# Patient Record
Sex: MALE | Race: WHITE | NOT HISPANIC OR LATINO | Employment: OTHER | URBAN - METROPOLITAN AREA
[De-identification: names, ages, dates, MRNs, and addresses within clinical notes are randomized per-mention and may not be internally consistent; named-entity substitution may affect disease eponyms.]

---

## 2017-05-01 ENCOUNTER — HOSPITAL ENCOUNTER (OUTPATIENT)
Dept: LAB | Facility: HOSPITAL | Age: 82
Discharge: HOME | End: 2017-05-01
Attending: FAMILY MEDICINE | Admitting: FAMILY MEDICINE

## 2017-05-01 LAB
INR PPP: 2.1 (ref 2–3)
PROTHROMBIN TIME: 22.7 SEC (ref 19.4–28.5)

## 2017-09-01 ENCOUNTER — HOSPITAL ENCOUNTER (OUTPATIENT)
Dept: PAIN MEDICINE | Facility: HOSPITAL | Age: 82
Discharge: HOME | End: 2017-09-01
Attending: ANESTHESIOLOGY | Admitting: ANESTHESIOLOGY

## 2017-09-19 ENCOUNTER — HOSPITAL ENCOUNTER (OUTPATIENT)
Dept: PAIN MEDICINE | Facility: HOSPITAL | Age: 82
Discharge: HOME | End: 2017-09-19
Attending: ANESTHESIOLOGY | Admitting: ANESTHESIOLOGY

## 2017-11-10 ENCOUNTER — HOSPITAL ENCOUNTER (OUTPATIENT)
Dept: PAIN MEDICINE | Facility: HOSPITAL | Age: 82
Discharge: HOME | End: 2017-11-10
Attending: ANESTHESIOLOGY | Admitting: ANESTHESIOLOGY

## 2017-11-10 ENCOUNTER — HOSPITAL ENCOUNTER (OUTPATIENT)
Dept: LAB | Facility: HOSPITAL | Age: 82
Discharge: HOME | End: 2017-11-10
Attending: ANESTHESIOLOGY | Admitting: ANESTHESIOLOGY

## 2017-11-10 LAB
INR PPP: 1.1
PROTHROMBIN TIME: 11.5 SEC (ref 9.6–11.7)

## 2019-12-31 ENCOUNTER — TRANSCRIBE ORDERS (OUTPATIENT)
Dept: ADMINISTRATIVE | Facility: HOSPITAL | Age: 84
End: 2019-12-31

## 2019-12-31 DIAGNOSIS — R06.02 SHORTNESS OF BREATH: Primary | ICD-10-CM

## 2020-01-03 ENCOUNTER — HOSPITAL ENCOUNTER (OUTPATIENT)
Dept: CARDIOLOGY | Facility: HOSPITAL | Age: 85
Discharge: HOME | End: 2020-01-03
Admitting: INTERNAL MEDICINE

## 2020-01-03 DIAGNOSIS — R06.02 SHORTNESS OF BREATH: ICD-10-CM

## 2020-01-03 PROCEDURE — 93306 TTE W/DOPPLER COMPLETE: CPT

## 2020-01-04 LAB
BH CV ECHO MEAS - ACS: 2.2 CM
BH CV ECHO MEAS - AO MAX PG: 3.8 MMHG
BH CV ECHO MEAS - AO MEAN PG: 2.1 MMHG
BH CV ECHO MEAS - AO ROOT DIAM: 3.9 CM
BH CV ECHO MEAS - AO V2 MAX: 97 CM/SEC
BH CV ECHO MEAS - AO V2 VTI: 20 CM
BH CV ECHO MEAS - AVA(I,D): 3.4 CM2
BH CV ECHO MEAS - EDV(CUBED): 132.3 ML
BH CV ECHO MEAS - EDV(MOD-SP4): 124 ML
BH CV ECHO MEAS - EF(CUBED): 64 %
BH CV ECHO MEAS - EF(MOD-SP4): 55 %
BH CV ECHO MEAS - EF(TEICH): 55 %
BH CV ECHO MEAS - ESV(CUBED): 47.8 ML
BH CV ECHO MEAS - ESV(MOD-SP4): 56 ML
BH CV ECHO MEAS - FS: 29 %
BH CV ECHO MEAS - IVSD: 1 CM
BH CV ECHO MEAS - LA DIMENSION(2D): 4.1 CM
BH CV ECHO MEAS - LV MAX PG: 3.2 MMHG
BH CV ECHO MEAS - LV MEAN PG: 1.6 MMHG
BH CV ECHO MEAS - LV V1 MAX: 90 CM/SEC
BH CV ECHO MEAS - LV V1 VTI: 19 CM
BH CV ECHO MEAS - LVIDD: 5.1 CM
BH CV ECHO MEAS - LVIDS: 3.6 CM
BH CV ECHO MEAS - LVOT DIAM: 2.2 CM
BH CV ECHO MEAS - MV A MAX VEL: 34.1 CM/SEC
BH CV ECHO MEAS - MV DEC TIME: 0 MSEC
BH CV ECHO MEAS - MV E MAX VEL: 87 CM/SEC
BH CV ECHO MEAS - MV E/A: 2.5
BH CV ECHO MEAS - MV MAX PG: 4 MMHG
BH CV ECHO MEAS - MV MEAN PG: 1 MMHG
BH CV ECHO MEAS - MV P1/2T: 628 MSEC
BH CV ECHO MEAS - MV V2 VTI: 25 CM
BH CV ECHO MEAS - MVA(P1/2T): 2.8 CM2
BH CV ECHO MEAS - PA ACC TIME: 0 SEC
BH CV ECHO MEAS - RAP SYSTOLE: 3 MMHG
BH CV ECHO MEAS - RV MAX PG: 97 MMHG
BH CV ECHO MEAS - RV V1 VTI: 13 CM
BH CV ECHO MEAS - RVDD(2D): 2.4 CM
BH CV ECHO MEAS - RVSP: 23 MMHG
BH CV ECHO MEAS - SV(MOD-SP4): 68 ML
BH CV ECHO MEAS - TR MAX PG: 20
BH CV ECHO MEAS - TR MAX VEL: 223 CM/SEC
Lab: 1 CM
MAXIMAL PREDICTED HEART RATE: 130 BPM
STRESS TARGET HR: 111 BPM

## 2020-01-04 PROCEDURE — 93306 TTE W/DOPPLER COMPLETE: CPT | Performed by: INTERNAL MEDICINE

## 2023-08-17 ENCOUNTER — OFFICE VISIT (OUTPATIENT)
Dept: CARDIOLOGY | Facility: CLINIC | Age: 88
End: 2023-08-17
Payer: MEDICARE

## 2023-08-17 VITALS
HEART RATE: 98 BPM | WEIGHT: 146 LBS | DIASTOLIC BLOOD PRESSURE: 64 MMHG | SYSTOLIC BLOOD PRESSURE: 98 MMHG | HEIGHT: 64 IN | BODY MASS INDEX: 24.92 KG/M2

## 2023-08-17 DIAGNOSIS — I48.0 PAF (PAROXYSMAL ATRIAL FIBRILLATION): Primary | ICD-10-CM

## 2023-08-17 PROCEDURE — 99204 OFFICE O/P NEW MOD 45 MIN: CPT | Performed by: INTERNAL MEDICINE

## 2023-08-17 PROCEDURE — 93000 ELECTROCARDIOGRAM COMPLETE: CPT | Performed by: INTERNAL MEDICINE

## 2023-08-17 RX ORDER — OMEPRAZOLE 40 MG/1
1 CAPSULE, DELAYED RELEASE ORAL DAILY
COMMUNITY
Start: 2017-09-01

## 2023-08-17 RX ORDER — SIMVASTATIN 20 MG
1 TABLET ORAL DAILY
COMMUNITY
Start: 2017-09-01

## 2023-08-17 RX ORDER — FUROSEMIDE 20 MG/1
1 TABLET ORAL 2 TIMES DAILY
COMMUNITY
Start: 2017-09-01

## 2023-08-17 RX ORDER — DIGOXIN 125 MCG
125 TABLET ORAL 3 TIMES WEEKLY
COMMUNITY

## 2023-08-17 RX ORDER — PRIMIDONE 50 MG/1
3 TABLET ORAL 2 TIMES DAILY
COMMUNITY

## 2023-08-17 RX ORDER — WARFARIN SODIUM 2 MG/1
TABLET ORAL
COMMUNITY

## 2023-08-17 RX ORDER — WARFARIN SODIUM 4 MG/1
TABLET ORAL
COMMUNITY

## 2023-08-17 RX ORDER — LEVOTHYROXINE SODIUM 0.05 MG/1
1 TABLET ORAL DAILY
COMMUNITY
Start: 2017-09-01

## 2023-08-17 RX ORDER — AMIODARONE HYDROCHLORIDE 200 MG/1
1 TABLET ORAL DAILY
COMMUNITY

## 2023-08-17 RX ORDER — FERROUS SULFATE 325(65) MG
325 TABLET ORAL
COMMUNITY

## 2023-08-17 RX ORDER — POTASSIUM CHLORIDE 20 MEQ/1
20 TABLET, EXTENDED RELEASE ORAL DAILY
COMMUNITY

## 2023-08-17 NOTE — PROGRESS NOTES
Date of Office Visit: 2023  Encounter Provider: Papi Blood MD  Place of Service: [facility] CARDIOLOGY  Patient Name: Royal DHEERAJ Antonio  :1929    Chief Complaint   Patient presents with    Atrial Fibrillation           Hypertension           Sleep Apnea          :     HPI: Royal DHEERAJ Antonio is a 89 y.o. male who presents today for atrial fibrillation.      He followed with [facility].  Son and daughter in law are with him.     He has atrial fibrillation, PAF by notes, but is in AF today.     He is not aware of the AF    Some cognitive dysfunction    He is on amiodarone, prior notes say sotalol as well, but per his med list not currently.             Past Medical History:   Diagnosis Date    A-fib     HTN (hypertension)     Hyperlipidemia        No past surgical history on file.    Social History     Socioeconomic History    Marital status:        No family history on file.    Review of Systems   Unable to perform ROS: Dementia     No Known Allergies      Current Outpatient Medications:     amiodarone (PACERONE) 200 MG tablet, Take 1 tablet by mouth Daily., Disp: , Rfl:     digoxin (LANOXIN) 125 MCG tablet, Take 1 tablet by mouth 3 (Three) Times a Week., Disp: , Rfl:     ferrous sulfate 325 (65 FE) MG tablet, Take 1 tablet by mouth Daily With Breakfast., Disp: , Rfl:     furosemide (LASIX) 20 MG tablet, Take 1 tablet by mouth 2 (Two) Times a Day., Disp: , Rfl:     levothyroxine (SYNTHROID, LEVOTHROID) 50 MCG tablet, Take 1 tablet by mouth Daily., Disp: , Rfl:     Misc Natural Products (JOINT SUPPORT PO), Take  by mouth Daily., Disp: , Rfl:     omeprazole (priLOSEC) 40 MG capsule, Take 1 capsule by mouth Daily., Disp: , Rfl:     potassium chloride (K-DUR,KLOR-CON) 20 MEQ CR tablet, Take 1 tablet by mouth Daily., Disp: , Rfl:     primidone (MYSOLINE) 50 MG tablet, Take 3 tablets by mouth 2 (Two) Times a Day., Disp: , Rfl:     simvastatin (ZOCOR) 20 MG tablet, Take 1  "tablet by mouth Daily., Disp: , Rfl:     Sotalol HCl (SORINE PO), Take 40 mg by mouth 2 (Two) Times a Day., Disp: , Rfl:     warfarin (COUMADIN) 2 MG tablet, Take 1 tablet every day by oral route for 90 days., Disp: , Rfl:     warfarin (COUMADIN) 4 MG tablet, Take 2 tablets every day by oral route as directed for 90 days., Disp: , Rfl:       Objective:     Vitals:    08/17/23 1258   BP: 98/64   Pulse: 98   Weight: 66.2 kg (146 lb)   Height: 162.6 cm (64\")     Body mass index is 25.06 kg/mý.    PHYSICAL EXAM:    Vitals and nursing note reviewed.   Constitutional:       Appearance: Well-developed. Frail.   Eyes:      General:         Right eye: No discharge.         Left eye: No discharge.      Conjunctiva/sclera: Conjunctivae normal.   HENT:      Head: Normocephalic and atraumatic.   Neck:      Vascular: No JVD.   Pulmonary:      Effort: Pulmonary effort is normal. No respiratory distress.   Cardiovascular:      Normal rate. Irregular rhythm.      Murmurs: There is no murmur.   Edema:     Peripheral edema absent.   Abdominal:      General: There is no distension.      Tenderness: There is no abdominal tenderness.   Musculoskeletal: Normal range of motion.      Cervical back: Neck supple. Neurological:      General: No focal deficit present.      Mental Status: Alert and oriented to person, place and time.      Cranial Nerves: No cranial nerve deficit.   Psychiatric:         Speech: Speech is tangential.         Behavior: Behavior is cooperative.           ECG 12 Lead    Date/Time: 8/17/2023 3:31 PM  Performed by: Papi Blood MD  Authorized by: Papi Blood MD   Rhythm: atrial fibrillation  Conduction: right bundle branch block          Assessment:       Diagnosis Plan   1. PAF (paroxysmal atrial fibrillation)               Plan:       PAF, but possibly persistent    Asymptomatic    There is no reason to continue amiodarone and I've asked him to discontinue.  He is not taking sotalol by his med " list, but on prior notes.  I asked them to make sure.     We will stop simvastatin.  This will further help reduce medication burden and no evidence of benefit in this age group.     We discussed switching from wafarin to eliquis.  We are not at this time, but would consider at future visits.     As always, it has been a pleasure to participate in your patient's care.      Sincerely,         Papi Blood MD

## 2024-01-10 ENCOUNTER — TELEPHONE (OUTPATIENT)
Dept: CARDIOLOGY | Facility: CLINIC | Age: 89
End: 2024-01-10
Payer: MEDICARE

## 2024-01-10 NOTE — TELEPHONE ENCOUNTER
"I spoke with pt's son, Michael, who shares that pt's wife has just recently passed away.  They've also attended someone else's  this week and will be burying pt's wife in the near future.      On 24 pt went to go see his PCP (saw COLE as MD wasn't in office) for a rash on his leg.  Incidentally while he was there, COLE noted pt's lungs sounded \"gunky\" and he had a cough.  She prescribed Augmentin BID for pt for both of these.  Pt also had blue fingertips in the office appt and thus oxygen was prescribed for him.  Michael tells me that he wasn't told a diagnosis for the O2 therapy or what the underlying cause was.  Pt's cough has improved since being on medication.    Pt has been wearing 2L NC at home continuously with an O2 sat in the low 90s.  When oxygen is removed, he drops to 82-85%.  When oxygen is off, his fingers and toes turn blue and pt has some slight oral cyanosis.  Last BP he has on pt was at the appt Thursday- 130/85.  He's unsure what his HR has been running.  Pt denies dizziness, lightheadedness, or palpitations.    Michael felt pt should be seen for an appt, and they are driving his mother's body up from [place] tomorrow.  He says that pt would be able to come in some time Friday afternoon for an appt.    Do you have any recommendations for this patient?    Thank you,    Kavita VIDALES RN  [facility]  01/10/24  16:13 EST    "

## 2024-01-10 NOTE — TELEPHONE ENCOUNTER
Caller: JANELLE DOMINGUEZ    Relationship to patient: Emergency Contact    Best call back number: [phone]    Chief complaint: HAVING TO BE ON OXYGEN (HAD BLUE FINGERNAILS WITH OUT OXYGEN) SINCE 1/4/24. HIS WIFE JUST PASSED FAMILY ISN'T SURE IF IT STRESS OR HEART RELATED    Type of visit: FOLLOW UP    Requested date:OUT OF TOWN FOR FUNAERAL BUT CAN BE BACK FOR A FRIDAY 1/12/24 AFTERNOON APPT OR SOMETHING MONDAY IF POSSIBLE.

## 2024-01-16 ENCOUNTER — TRANSCRIBE ORDERS (OUTPATIENT)
Dept: ADMINISTRATIVE | Facility: HOSPITAL | Age: 89
End: 2024-01-16
Payer: MEDICARE

## 2024-01-16 ENCOUNTER — HOSPITAL ENCOUNTER (OUTPATIENT)
Dept: RESPIRATORY THERAPY | Facility: HOSPITAL | Age: 89
Discharge: HOME | End: 2024-01-16
Payer: MEDICARE

## 2024-01-16 DIAGNOSIS — I48.91 ATRIAL FIBRILLATION, UNSPECIFIED TYPE: Primary | ICD-10-CM

## 2024-01-16 DIAGNOSIS — I48.91 ATRIAL FIBRILLATION, UNSPECIFIED TYPE: ICD-10-CM

## 2024-01-16 DIAGNOSIS — I24.9 ACUTE CORONARY SYNDROME: ICD-10-CM

## 2024-01-16 LAB
ARTERIAL PATENCY WRIST A: POSITIVE
ATMOSPHERIC PRESS: NORMAL MM[HG]
BASE EXCESS BLDA CALC-SCNC: 1.8 MMOL/L (ref 0–3)
BDY SITE: NORMAL
CO2 BLDA-SCNC: 27 MMOL/L (ref 22–29)
HCO3 BLDA-SCNC: 25.8 MMOL/L (ref 21–28)
HEMODILUTION: NO
MODALITY: NORMAL
PCO2 BLDA: 37.5 MM HG (ref 35–48)
PH BLDA: 7.45 PH UNITS (ref 7.35–7.45)
PO2 BLDA: 91.9 MM HG (ref 83–108)
SAO2 % BLDCOA: 97.5 % (ref 94–98)

## 2024-01-16 PROCEDURE — 36600 WITHDRAWAL OF ARTERIAL BLOOD: CPT

## 2024-01-16 PROCEDURE — 82803 BLOOD GASES ANY COMBINATION: CPT

## 2024-04-02 ENCOUNTER — OFFICE VISIT (OUTPATIENT)
Age: 89
End: 2024-04-02
Payer: MEDICARE

## 2024-04-02 VITALS
BODY MASS INDEX: 23.39 KG/M2 | SYSTOLIC BLOOD PRESSURE: 118 MMHG | DIASTOLIC BLOOD PRESSURE: 64 MMHG | HEIGHT: 64 IN | WEIGHT: 137 LBS | HEART RATE: 98 BPM

## 2024-04-02 DIAGNOSIS — I48.0 PAF (PAROXYSMAL ATRIAL FIBRILLATION): Primary | ICD-10-CM

## 2024-04-02 PROCEDURE — 99213 OFFICE O/P EST LOW 20 MIN: CPT

## 2024-04-02 PROCEDURE — 93000 ELECTROCARDIOGRAM COMPLETE: CPT

## 2024-04-02 RX ORDER — APIXABAN 2.5 MG/1
1 TABLET, FILM COATED ORAL 2 TIMES DAILY
COMMUNITY

## 2024-04-02 RX ORDER — MELATONIN
1000 DAILY
COMMUNITY

## 2024-04-02 NOTE — PROGRESS NOTES
Date of Office Visit: 2024  Encounter Provider: COLE Giraldo  Place of Service: [facility] CARDIOLOGY  Patient Name: Royal DHEERAJ Antonio  :1929    Chief Complaint   Patient presents with    Atrial Fibrillation   :     HPI: Royal DHEERAJ Antonio is a 89 y.o. male who followed with Dr. Foster, referred to Dr. Blood. He has atrial fibrillation.     Saw Dr. Blood last August. Was on amiodarone and sotalol for AF. He was in persistent during that visit, sotalol was stopped.     Since he had no awareness of his AF, recommended rate control.     His son called the office in January stating that his dad was having issues with low oxygenation and was requiring temporary oxygen.  I recommended that he go to the ER or be evaluated by PCP.                Presents today for follow-up appointment.    He recently had a mechanical fall and fractured a couple ribs on his left shoulder.    Cording to his son.  They do not plan on operating just monitoring and allowing to heal.  He has follow-up with them soon    In terms of his cardiac health.  He has been doing well.  He has no complaints or concerns in office today.    He remains in atrial fibrillation.  He has no awareness that he is in A-fib.    Denies any chest pain, shortness of breath, palpitations, or syncope    His heart rate is well-controlled.  He is on digoxin 125 mcg daily.    He was on sotalol and amiodarone in the past.  These have both been stopped.    His son says that he follows with Dr. Barroso PCP for routine lab work and recently had labs drawn.    He is on apixaban for AC.          Past Medical History:   Diagnosis Date    A-fib     HTN (hypertension)     Hyperlipidemia        No past surgical history on file.    Social History     Socioeconomic History    Marital status:        No family history on file.    Review of Systems   Constitutional: Negative for chills, fever and malaise/fatigue.   Cardiovascular:   "Negative for chest pain, dyspnea on exertion, leg swelling, near-syncope, orthopnea, palpitations, paroxysmal nocturnal dyspnea and syncope.   Respiratory:  Negative for cough and shortness of breath.    Hematologic/Lymphatic: Negative.    Musculoskeletal:  Negative for joint pain, joint swelling and myalgias.   Gastrointestinal:  Negative for abdominal pain, diarrhea, melena, nausea and vomiting.   Genitourinary:  Negative for frequency and hematuria.   Neurological:  Negative for light-headedness, numbness, paresthesias and seizures.   Allergic/Immunologic: Negative.    All other systems reviewed and are negative.      No Known Allergies      Current Outpatient Medications:     Cholecalciferol 25 MCG (1000 UT) tablet, Take 1 tablet by mouth Daily., Disp: , Rfl:     digoxin (LANOXIN) 125 MCG tablet, Take 1 tablet by mouth Daily., Disp: , Rfl:     Eliquis 2.5 MG tablet tablet, Take 1 tablet by mouth 2 (Two) Times a Day., Disp: , Rfl:     ferrous sulfate 325 (65 FE) MG tablet, Take 1 tablet by mouth Daily With Breakfast., Disp: , Rfl:     furosemide (LASIX) 20 MG tablet, Take 1 tablet by mouth 2 (Two) Times a Day., Disp: , Rfl:     levothyroxine (SYNTHROID, LEVOTHROID) 50 MCG tablet, Take 1 tablet by mouth Daily., Disp: , Rfl:     Misc Natural Products (JOINT SUPPORT PO), Take  by mouth Daily., Disp: , Rfl:     omeprazole (priLOSEC) 40 MG capsule, Take 1 capsule by mouth Daily., Disp: , Rfl:     potassium chloride (K-DUR,KLOR-CON) 20 MEQ CR tablet, Take 1 tablet by mouth Daily., Disp: , Rfl:       Objective:     Vitals:    04/02/24 1114   BP: 118/64   Pulse: 98   Weight: 62.1 kg (137 lb)   Height: 162.6 cm (64\")     Body mass index is 23.52 kg/m².    PHYSICAL EXAM:    Vitals Reviewed.   General Appearance: No acute distress, well developed and well nourished.   Eyes: Conjunctiva and lids: No erythema, swelling, or discharge. Sclera non-icteric.   HENT: Atraumatic, normocephalic. External eyes, ears, and nose normal. "   Respiratory: No signs of respiratory distress. Respiration rhythm and depth normal.   Clear to auscultation. No rales, crackles, rhonchi, or wheezing auscultated.   Cardiovascular:  Heart Rate and Rhythm: Normal, Heart Sounds: Normal S1 and S2. No S3 or S4 noted.  Gastrointestinal:  Abdomen soft, non-distended, non-tender.   Musculoskeletal: Normal movement of extremities  Skin: Warm and dry.   Psychiatric: Patient alert and oriented to person, place, and time. Speech and behavior appropriate. Normal mood and affect.       ECG 12 Lead    Date/Time: 4/2/2024 11:46 AM  Performed by: Rafat Thompson APRN    Authorized by: Rafat Thompson APRN  Comparison: compared with previous ECG   Similar to previous ECG  Rhythm: sinus rhythm            Assessment:       Diagnosis Plan   1. PAF (paroxysmal atrial fibrillation)               Plan:   PAF--- suspect he has now gone into persistent atrial fibrillation.  Rhythm control was pursued in the past with sotalol and amiodarone but these were both stopped and we are now pursuing rate control.  He has no symptoms associated with his atrial fibrillation and his rate is well-controlled on low-dose digoxin.    I would not recommend any aggressive treatment for his atrial fibrillation.  If he does have issues with rate control.  I would consider starting low-dose beta-blocker but would not restart sotalol or amiodarone..    Continue apixaban for AC.          Follow-up in 1 year with Dr. Boyce or sooner if he has issues with his atrial fibrillation.          As always, it has been a pleasure to participate in your patient's care.      Sincerely,         COLE Giraldo

## 2024-06-14 ENCOUNTER — APPOINTMENT (OUTPATIENT)
Dept: CT IMAGING | Facility: HOSPITAL | Age: 89
End: 2024-06-14
Payer: MEDICARE

## 2024-06-14 ENCOUNTER — HOSPITAL ENCOUNTER (EMERGENCY)
Facility: HOSPITAL | Age: 89
Discharge: HOME | End: 2024-06-14
Attending: EMERGENCY MEDICINE
Payer: MEDICARE

## 2024-06-14 VITALS
TEMPERATURE: 98.3 F | SYSTOLIC BLOOD PRESSURE: 124 MMHG | OXYGEN SATURATION: 97 % | HEIGHT: 63 IN | WEIGHT: 146 LBS | HEART RATE: 91 BPM | BODY MASS INDEX: 25.87 KG/M2 | RESPIRATION RATE: 18 BRPM | DIASTOLIC BLOOD PRESSURE: 74 MMHG

## 2024-06-14 DIAGNOSIS — S61.412A SKIN TEAR OF LEFT HAND WITHOUT COMPLICATION, INITIAL ENCOUNTER: ICD-10-CM

## 2024-06-14 DIAGNOSIS — S09.90XA CLOSED HEAD INJURY, INITIAL ENCOUNTER: Primary | ICD-10-CM

## 2024-06-14 DIAGNOSIS — S01.81XA LACERATION OF FOREHEAD, INITIAL ENCOUNTER: ICD-10-CM

## 2024-06-14 PROCEDURE — 99284 EMERGENCY DEPT VISIT MOD MDM: CPT

## 2024-06-14 PROCEDURE — 12052 INTMD RPR FACE/MM 2.6-5.0 CM: CPT | Performed by: EMERGENCY MEDICINE

## 2024-06-14 PROCEDURE — 99283 EMERGENCY DEPT VISIT LOW MDM: CPT | Performed by: EMERGENCY MEDICINE

## 2024-06-14 PROCEDURE — 70450 CT HEAD/BRAIN W/O DYE: CPT

## 2024-06-14 RX ORDER — DIAPER,BRIEF,INFANT-TODD,DISP
1 EACH MISCELLANEOUS ONCE
Status: COMPLETED | OUTPATIENT
Start: 2024-06-14 | End: 2024-06-14

## 2024-06-14 RX ORDER — LIDOCAINE HYDROCHLORIDE AND EPINEPHRINE 10; 10 MG/ML; UG/ML
10 INJECTION, SOLUTION INFILTRATION; PERINEURAL ONCE
Status: COMPLETED | OUTPATIENT
Start: 2024-06-14 | End: 2024-06-14

## 2024-06-14 RX ORDER — CEPHALEXIN 500 MG/1
500 CAPSULE ORAL 4 TIMES DAILY
Qty: 40 CAPSULE | Refills: 0 | Status: SHIPPED | OUTPATIENT
Start: 2024-06-14 | End: 2024-06-24

## 2024-06-14 RX ADMIN — LIDOCAINE HYDROCHLORIDE AND EPINEPHRINE 10 ML: 10; 10 INJECTION, SOLUTION INFILTRATION; PERINEURAL at 11:19

## 2024-06-14 RX ADMIN — BACITRACIN 0.9 G: 500 OINTMENT TOPICAL at 11:55

## 2024-06-14 NOTE — FSED PROVIDER NOTE
Subjective   History of Present Illness  Anticoagulated 89-year-old male presents for evaluation after having a ground-level fall hitting his forehead today.  He was bending over to  a piece of trash and he lost his balance going forward.  Did not have any loss of consciousness does not have severe headache.  Injury happened about 45 minutes prior to arrival.  Patient and family state that he is up-to-date on tetanus.  Review of Systems   Skin:  Positive for wound.       Past Medical History:   Diagnosis Date    A-fib     HTN (hypertension)     Hyperlipidemia        No Known Allergies    History reviewed. No pertinent surgical history.    History reviewed. No pertinent family history.    Social History     Socioeconomic History    Marital status:            Objective   Physical Exam  Nursing notes reviewed.  INITIAL VITAL SIGNS: Reviewed by me.  Pulse ox normal  GENERAL: Alert. Well developed and well nourished. No respiratory distress.  HEAD: 5 cm jagged laceration across the mid forehead  EYES: No conjunctival injection.  ENT: Oral mucosa is moist.  NECK/BACK: Supple. Full range of motion.  RESPIRATORY: Non-labored respirations.   EXTREMITIES: No deformity.  Small skin tear on the dorsum of the left hand approximately 1 cm across in a nearly semicircular shape  SKIN: Warm and dry. No rashes. No diaphoresis.  NEUROLOGIC: Alert. Normal gait    Laceration Repair    Date/Time: 6/14/2024 11:43 AM    Performed by: Anders Lyon MD  Authorized by: Anders Lyon MD    Consent:     Consent obtained:  Verbal    Consent given by:  Patient    Risks, benefits, and alternatives were discussed: yes      Risks discussed:  Infection, pain, retained foreign body, need for additional repair and poor cosmetic result  Universal protocol:     Procedure explained and questions answered to patient or proxy's satisfaction: yes      Immediately prior to procedure, a time out was called: yes      Patient identity  confirmed:  Verbally with patient  Anesthesia:     Anesthesia method:  Local infiltration    Local anesthetic:  Lidocaine 1% WITH epi  Laceration details:     Location:  Face    Face location:  Forehead    Length (cm):  5  Pre-procedure details:     Preparation:  Patient was prepped and draped in usual sterile fashion (CT scan obtained)  Exploration:     Limited defect created (wound extended): no      Contaminated: yes    Treatment:     Area cleansed with:  Saline    Amount of cleaning:  Extensive    Irrigation solution:  Sterile saline    Visualized foreign bodies/material removed: yes (removed 3 small rocks, 3 pieces of leaf, 1 piece grass)      Debridement:  Minimal    Undermining:  None    Scar revision: no    Skin repair:     Repair method:  Sutures    Suture size:  5-0    Suture material:  Fast-absorbing gut    Suture technique:  Simple interrupted    Number of sutures:  5  Approximation:     Approximation:  Close  Repair type:     Repair type:  Intermediate  Post-procedure details:     Dressing:  Antibiotic ointment and non-adherent dressing    Procedure completion:  Tolerated             ED Course                                           Medical Decision Making  Problems Addressed:  Closed head injury, initial encounter: complicated acute illness or injury  Laceration of forehead, initial encounter: complicated acute illness or injury  Skin tear of left hand without complication, initial encounter: complicated acute illness or injury    Amount and/or Complexity of Data Reviewed  Radiology: ordered.    Risk  OTC drugs.  Prescription drug management.    Patient with low velocity head injury but on Eliquis so CT was obtained there is no intracranial hemorrhage.  There are multiple foreign bodies seen on imaging also seen with direct visualization removed as noted.  Patient also has skin tear on dorsum of left hand this repaired with Steri-Strips.  Due to dirtiness of wound putting on prophylactic  antibiotics    Final diagnoses:   Closed head injury, initial encounter   Laceration of forehead, initial encounter   Skin tear of left hand without complication, initial encounter       ED Disposition  ED Disposition       ED Disposition   Discharge    Condition   Good    Comment   --               Ravinder Barroso MD  [address] [place] [address] [phone]    Call   As needed         Medication List        New Prescriptions      cephalexin 500 MG capsule  Commonly known as: KEFLEX  Take 1 capsule by mouth 4 (Four) Times a Day for 10 days.               Where to Get Your Medications        These medications were sent to [id number] - [place], [place] - [address] [place] [address] [address]  - [phone]  - [phone] [address] [place] [address]      Phone: [phone]   cephalexin 500 MG capsule

## 2024-06-14 NOTE — DISCHARGE INSTRUCTIONS
Keep dressing on wound until Sunday, you may take it off and then bathe normally.  Do not go swimming until laceration is healed.  Take medications as prescribed.  Follow-up with your doctor.  Return for reevaluation if you have concerns for infection despite antibiotics including but not limited to swelling, worsening pain, worsening redness, discharge of purulent material.

## 2024-12-15 ENCOUNTER — HOSPITAL ENCOUNTER (OUTPATIENT)
Facility: HOSPITAL | Age: 89
Discharge: HOME | End: 2024-12-15
Attending: EMERGENCY MEDICINE | Admitting: EMERGENCY MEDICINE
Payer: MEDICARE

## 2024-12-15 ENCOUNTER — APPOINTMENT (OUTPATIENT)
Dept: GENERAL RADIOLOGY | Facility: HOSPITAL | Age: 89
End: 2024-12-15
Payer: MEDICARE

## 2024-12-15 VITALS
HEIGHT: 65 IN | SYSTOLIC BLOOD PRESSURE: 110 MMHG | RESPIRATION RATE: 20 BRPM | BODY MASS INDEX: 23.32 KG/M2 | HEART RATE: 107 BPM | DIASTOLIC BLOOD PRESSURE: 58 MMHG | TEMPERATURE: 97.8 F | WEIGHT: 140 LBS | OXYGEN SATURATION: 94 %

## 2024-12-15 DIAGNOSIS — J18.9 PNEUMONIA OF BOTH LOWER LOBES DUE TO INFECTIOUS ORGANISM: Primary | ICD-10-CM

## 2024-12-15 LAB
FLUAV SUBTYP SPEC NAA+PROBE: NOT DETECTED
FLUBV RNA ISLT QL NAA+PROBE: NOT DETECTED
RSV RNA NPH QL NAA+NON-PROBE: NOT DETECTED
SARS-COV-2 RNA RESP QL NAA+PROBE: NOT DETECTED

## 2024-12-15 PROCEDURE — G0463 HOSPITAL OUTPT CLINIC VISIT: HCPCS | Performed by: EMERGENCY MEDICINE

## 2024-12-15 PROCEDURE — 71046 X-RAY EXAM CHEST 2 VIEWS: CPT

## 2024-12-15 PROCEDURE — 87637 SARSCOV2&INF A&B&RSV AMP PRB: CPT | Performed by: EMERGENCY MEDICINE

## 2024-12-15 RX ORDER — ACETAMINOPHEN 500 MG
500 TABLET ORAL EVERY 6 HOURS PRN
Qty: 30 TABLET | Refills: 0 | Status: SHIPPED | OUTPATIENT
Start: 2024-12-15

## 2024-12-15 RX ORDER — ACETAMINOPHEN 500 MG
500 TABLET ORAL EVERY 6 HOURS PRN
Qty: 30 TABLET | Refills: 0 | Status: SHIPPED | OUTPATIENT
Start: 2024-12-15 | End: 2024-12-15

## 2024-12-15 RX ORDER — ALBUTEROL SULFATE 90 UG/1
2 INHALANT RESPIRATORY (INHALATION) EVERY 4 HOURS PRN
Qty: 8 G | Refills: 0 | Status: SHIPPED | OUTPATIENT
Start: 2024-12-15 | End: 2024-12-15

## 2024-12-15 RX ORDER — DEXTROMETHORPHAN HYDROBROMIDE AND PROMETHAZINE HYDROCHLORIDE 15; 6.25 MG/5ML; MG/5ML
5 SYRUP ORAL 4 TIMES DAILY PRN
Qty: 473 ML | Refills: 0 | Status: SHIPPED | OUTPATIENT
Start: 2024-12-15 | End: 2024-12-15

## 2024-12-15 RX ORDER — ALBUTEROL SULFATE 90 UG/1
2 INHALANT RESPIRATORY (INHALATION) EVERY 4 HOURS PRN
Qty: 8 G | Refills: 0 | Status: SHIPPED | OUTPATIENT
Start: 2024-12-15

## 2024-12-15 RX ORDER — DEXTROMETHORPHAN HYDROBROMIDE AND PROMETHAZINE HYDROCHLORIDE 15; 6.25 MG/5ML; MG/5ML
5 SYRUP ORAL 4 TIMES DAILY PRN
Qty: 473 ML | Refills: 0 | Status: SHIPPED | OUTPATIENT
Start: 2024-12-15

## 2024-12-15 NOTE — FSED PROVIDER NOTE
Subjective   History of Present Illness  Patient with copmlaint of cough, congestion for almost 2 weeks. Cough productive of yellow sputum. Ill contacts with family. No chest pressure or discomfort. No leg pain or swelling. No other complaints. No fever, shakes, but chills. No vomiting, diarrhea. No precipitating or rlieeving factors. No pattern to symptoms.     History provided by:  Patient and relative   used: No        Review of Systems   All other systems reviewed and are negative.      Past Medical History:   Diagnosis Date    A-fib     HTN (hypertension)     Hyperlipidemia        No Known Allergies    History reviewed. No pertinent surgical history.    History reviewed. No pertinent family history.    Social History     Socioeconomic History    Marital status:    Substance and Sexual Activity    Drug use: Never    Sexual activity: Defer           Objective   Physical Exam  Constitutional:       Appearance: Normal appearance.   HENT:      Mouth/Throat:      Mouth: Mucous membranes are moist.      Pharynx: Oropharynx is clear.   Eyes:      Extraocular Movements: Extraocular movements intact.      Pupils: Pupils are equal, round, and reactive to light.   Cardiovascular:      Rate and Rhythm: Normal rate. Rhythm irregular.      Pulses: Normal pulses.      Heart sounds: Normal heart sounds.   Pulmonary:      Effort: Pulmonary effort is normal. No respiratory distress.      Breath sounds: No stridor. Rhonchi present. No wheezing or rales.   Chest:      Chest wall: No tenderness.   Musculoskeletal:         General: Normal range of motion.      Cervical back: Normal range of motion.   Skin:     General: Skin is warm.      Capillary Refill: Capillary refill takes less than 2 seconds.   Neurological:      General: No focal deficit present.      Mental Status: He is alert and oriented to person, place, and time.   Psychiatric:         Mood and Affect: Mood normal.         Procedures            ED Course                                           Medical Decision Making  Concern for uri, pneumonia. D/w patient and son. Agrees with plan.    Amount and/or Complexity of Data Reviewed  Labs: ordered.  Radiology: ordered.        Final diagnoses:   Pneumonia of both lower lobes due to infectious organism       ED Disposition  ED Disposition       ED Disposition   Discharge    Condition   Stable    Comment   --               Ravinder Barroso MD  [address] [facility] [address]  [place] [address]  [phone]    Go in 1 day      [facility] ED  [address] [id number] [phone]  Go in 2 days  If symptoms worsen         Medication List        New Prescriptions      acetaminophen 500 MG tablet  Commonly known as: TYLENOL  Take 1 tablet by mouth Every 6 (Six) Hours As Needed for Mild Pain, Moderate Pain or Fever.     albuterol sulfate  (90 Base) MCG/ACT inhaler  Commonly known as: PROVENTIL HFA;VENTOLIN HFA;PROAIR HFA  Inhale 2 puffs Every 4 (Four) Hours As Needed for Wheezing or Shortness of Air.     amoxicillin-clavulanate 875-125 MG per tablet  Commonly known as: AUGMENTIN  Take 1 tablet by mouth 2 (Two) Times a Day for 10 days.     promethazine-dextromethorphan 6.25-15 MG/5ML syrup  Commonly known as: PROMETHAZINE-DM  Take 5 mL by mouth 4 (Four) Times a Day As Needed for Cough.               Where to Get Your Medications        These medications were sent to [id number] - [place], [place] - [address] [place] [address] [address]  - [phone] [facility] [phone] [address] [place] [address]      Phone: [phone]   acetaminophen 500 MG tablet  albuterol sulfate  (90 Base) MCG/ACT inhaler  amoxicillin-clavulanate 875-125 MG per tablet  promethazine-dextromethorphan 6.25-15 MG/5ML syrup

## 2024-12-19 ENCOUNTER — TRANSCRIBE ORDERS (OUTPATIENT)
Dept: ADMINISTRATIVE | Facility: HOSPITAL | Age: 89
End: 2024-12-19
Payer: MEDICARE

## 2024-12-19 ENCOUNTER — OFFICE VISIT (OUTPATIENT)
Age: 89
End: 2024-12-19
Payer: MEDICARE

## 2024-12-19 VITALS
DIASTOLIC BLOOD PRESSURE: 80 MMHG | BODY MASS INDEX: 22.99 KG/M2 | HEIGHT: 65 IN | SYSTOLIC BLOOD PRESSURE: 114 MMHG | HEART RATE: 79 BPM | WEIGHT: 138 LBS

## 2024-12-19 DIAGNOSIS — I48.19 ATRIAL FIBRILLATION, PERSISTENT: Primary | ICD-10-CM

## 2024-12-19 DIAGNOSIS — I70.403: Primary | ICD-10-CM

## 2024-12-19 PROCEDURE — 93000 ELECTROCARDIOGRAM COMPLETE: CPT | Performed by: INTERNAL MEDICINE

## 2024-12-19 PROCEDURE — 99213 OFFICE O/P EST LOW 20 MIN: CPT | Performed by: INTERNAL MEDICINE

## 2024-12-19 NOTE — PROGRESS NOTES
Date of Office Visit: 2024  Encounter Provider: Papi Blood MD  Place of Service: [facility] CARDIOLOGY  Patient Name: Royal DHEERAJ Antonio  :1929    Chief Complaint   Patient presents with    paroxysmal AFIB   :     HPI: Royal DHEERAJ Antonio is a 89 y.o. male who presents today for persistent atrial fibrillation    He has persistent rate controlled atrial fibrillation.    He is tolerated Eliquis without trouble.    He had a recent pneumonia, but was not hospitalized.    His rate control medications consist of digoxin.          Past Medical History:   Diagnosis Date    A-fib     HTN (hypertension)     Hyperlipidemia        No past surgical history on file.    Social History     Socioeconomic History    Marital status:    Substance and Sexual Activity    Drug use: Never    Sexual activity: Defer       No family history on file.    Review of Systems   Constitutional: Negative.   Cardiovascular: Negative.    Respiratory: Negative.     Gastrointestinal: Negative.        No Known Allergies      Current Outpatient Medications:     acetaminophen (TYLENOL) 500 MG tablet, Take 1 tablet by mouth Every 6 (Six) Hours As Needed for Mild Pain, Moderate Pain or Fever., Disp: 30 tablet, Rfl: 0    albuterol sulfate  (90 Base) MCG/ACT inhaler, Inhale 2 puffs Every 4 (Four) Hours As Needed for Wheezing or Shortness of Air., Disp: 8 g, Rfl: 0    amoxicillin-clavulanate (AUGMENTIN) 875-125 MG per tablet, Take 1 tablet by mouth 2 (Two) Times a Day for 10 days., Disp: 20 tablet, Rfl: 0    Cholecalciferol 25 MCG (1000 UT) tablet, Take 1 tablet by mouth Daily., Disp: , Rfl:     digoxin (LANOXIN) 125 MCG tablet, Take 1 tablet by mouth Daily., Disp: , Rfl:     Eliquis 2.5 MG tablet tablet, Take 1 tablet by mouth 2 (Two) Times a Day., Disp: , Rfl:     ferrous sulfate 325 (65 FE) MG tablet, Take 1 tablet by mouth Daily With Breakfast., Disp: , Rfl:     furosemide (LASIX) 20 MG tablet, Take 1  "tablet by mouth 2 (Two) Times a Day., Disp: , Rfl:     levothyroxine (SYNTHROID, LEVOTHROID) 50 MCG tablet, Take 1 tablet by mouth Daily., Disp: , Rfl:     Misc Natural Products (JOINT SUPPORT PO), Take  by mouth Daily., Disp: , Rfl:     omeprazole (priLOSEC) 40 MG capsule, Take 1 capsule by mouth Daily., Disp: , Rfl:     potassium chloride (K-DUR,KLOR-CON) 20 MEQ CR tablet, Take 1 tablet by mouth Daily., Disp: , Rfl:     promethazine-dextromethorphan (PROMETHAZINE-DM) 6.25-15 MG/5ML syrup, Take 5 mL by mouth 4 (Four) Times a Day As Needed for Cough., Disp: 473 mL, Rfl: 0      Objective:     Vitals:    12/19/24 1144   BP: 114/80   BP Location: Left arm   Patient Position: Sitting   Pulse: 79   Weight: 62.6 kg (138 lb)   Height: 165.1 cm (65\")     Body mass index is 22.96 kg/m².    PHYSICAL EXAM:    Vitals and nursing note reviewed.   Constitutional:       General: Not in acute distress.  Pulmonary:      Effort: Pulmonary effort is normal. No respiratory distress.   Cardiovascular:      Normal rate. Regular rhythm.   Skin:     General: Skin is warm and dry.   Neurological:      Mental Status: Alert and oriented to person, place, and time.   Psychiatric:         Behavior: Behavior normal.         Thought Content: Thought content normal.         Judgment: Judgment normal.           ECG 12 Lead    Date/Time: 12/19/2024 12:28 PM  Performed by: Papi Blood MD    Authorized by: Papi Blood MD  Comparison: compared with previous ECG   Rhythm: atrial fibrillation  Conduction: right bundle branch block          Assessment:       Diagnosis Plan   1. Atrial fibrillation, persistent               Plan:       Stable rate controlled persistent atrial fibrillation.  He is tolerating 2.5 mg apixaban, no difficulty.      His rate control is adequate.     We should check renal function at next visit.      As always, it has been a pleasure to participate in your patient's care.      Sincerely,         Papi ESCOBAR" MD Caitie

## 2024-12-26 ENCOUNTER — HOSPITAL ENCOUNTER (OUTPATIENT)
Dept: CARDIOLOGY | Facility: HOSPITAL | Age: 89
Discharge: HOME | End: 2024-12-26
Admitting: PODIATRIST
Payer: MEDICARE

## 2024-12-26 DIAGNOSIS — I70.403: ICD-10-CM

## 2024-12-26 LAB
BH CV LOWER ARTERIAL LEFT ABI RATIO: NORMAL
BH CV LOWER ARTERIAL LEFT DORSALIS PEDIS SYS MAX: NORMAL
BH CV LOWER ARTERIAL LEFT GREAT TOE SYS MAX: 82
BH CV LOWER ARTERIAL LEFT POST TIBIAL SYS MAX: NORMAL
BH CV LOWER ARTERIAL LEFT TBI RATIO: 0.66
BH CV LOWER ARTERIAL RIGHT ABI RATIO: 1.68
BH CV LOWER ARTERIAL RIGHT DORSALIS PEDIS SYS MAX: 177
BH CV LOWER ARTERIAL RIGHT GREAT TOE SYS MAX: 63
BH CV LOWER ARTERIAL RIGHT POST TIBIAL SYS MAX: 208
BH CV LOWER ARTERIAL RIGHT TBI RATIO: 0.51
UPPER ARTERIAL LEFT ARM BRACHIAL SYS MAX: 124
UPPER ARTERIAL RIGHT ARM BRACHIAL SYS MAX: 116

## 2024-12-26 PROCEDURE — 93922 UPR/L XTREMITY ART 2 LEVELS: CPT
